# Patient Record
Sex: FEMALE | URBAN - METROPOLITAN AREA
[De-identification: names, ages, dates, MRNs, and addresses within clinical notes are randomized per-mention and may not be internally consistent; named-entity substitution may affect disease eponyms.]

---

## 2018-07-06 ENCOUNTER — IMPORTED ENCOUNTER (OUTPATIENT)
Dept: URBAN - METROPOLITAN AREA CLINIC 9 | Facility: CLINIC | Age: 33
End: 2018-07-06

## 2019-07-19 ENCOUNTER — IMPORTED ENCOUNTER (OUTPATIENT)
Dept: URBAN - METROPOLITAN AREA CLINIC 9 | Facility: CLINIC | Age: 34
End: 2019-07-19

## 2019-07-19 PROBLEM — H04.123: Noted: 2019-07-19

## 2021-10-16 ASSESSMENT — VISUAL ACUITY
OS_CC: 20/20 SN
OS_CC: 20/20 SN
OS_SC: 20/40 -2 SN
OD_SC: 20/20 - SN
OD_CC: 20/20 SN
OD_CC: 20/20 SN

## 2021-10-16 ASSESSMENT — TONOMETRY
OS_IOP_MMHG: 15
OD_IOP_MMHG: 14
OD_IOP_MMHG: 17
OS_IOP_MMHG: 14

## 2022-07-06 RX ORDER — METHYLPREDNISOLONE 4 MG/1
TABLET ORAL
COMMUNITY
Start: 2021-08-02

## 2022-07-06 RX ORDER — SPIRONOLACTONE 25 MG/1
TABLET ORAL
COMMUNITY
End: 2022-08-22 | Stop reason: SDUPTHER

## 2022-07-06 RX ORDER — MELOXICAM 15 MG/1
TABLET ORAL
COMMUNITY

## 2022-08-22 PROBLEM — M75.51 BURSITIS OF SHOULDER, RIGHT: Status: ACTIVE | Noted: 2022-08-22

## 2022-08-22 PROBLEM — G56.30 RADIAL NEUROPATHY: Status: ACTIVE | Noted: 2022-08-22

## 2022-08-22 PROBLEM — M19.041 ARTHRITIS OF FINGER OF RIGHT HAND: Status: ACTIVE | Noted: 2022-08-22

## 2023-05-01 ENCOUNTER — NEW PATIENT (OUTPATIENT)
Dept: URBAN - METROPOLITAN AREA CLINIC 9 | Facility: CLINIC | Age: 38
End: 2023-05-01

## 2023-05-01 DIAGNOSIS — H04.123: ICD-10-CM

## 2023-05-01 PROCEDURE — 92015 DETERMINE REFRACTIVE STATE: CPT

## 2023-05-01 PROCEDURE — 92004 COMPRE OPH EXAM NEW PT 1/>: CPT

## 2023-05-01 ASSESSMENT — TONOMETRY
OS_IOP_MMHG: 16
OD_IOP_MMHG: 17

## 2023-05-01 ASSESSMENT — VISUAL ACUITY
OD_SC: 20/30-1
OS_SC: 20/50-2

## 2024-12-02 ENCOUNTER — APPOINTMENT (RX ONLY)
Dept: URBAN - METROPOLITAN AREA CLINIC 20 | Facility: CLINIC | Age: 39
Setting detail: DERMATOLOGY
End: 2024-12-02

## 2024-12-02 DIAGNOSIS — Z41.9 ENCOUNTER FOR PROCEDURE FOR PURPOSES OTHER THAN REMEDYING HEALTH STATE, UNSPECIFIED: ICD-10-CM

## 2024-12-02 PROCEDURE — ? COSMETIC CONSULTATION: BOTOX

## 2024-12-02 PROCEDURE — ? INVENTORY

## 2024-12-02 PROCEDURE — ? COSMETIC CONSULTATION: COOLSCULPTING

## 2024-12-02 PROCEDURE — ? ADDITIONAL NOTES

## 2024-12-02 ASSESSMENT — LOCATION SIMPLE DESCRIPTION DERM
LOCATION SIMPLE: ABDOMEN
LOCATION SIMPLE: RIGHT FOREHEAD
LOCATION SIMPLE: GLABELLA
LOCATION SIMPLE: RIGHT TEMPLE
LOCATION SIMPLE: LEFT FOREHEAD
LOCATION SIMPLE: LEFT TEMPLE

## 2024-12-02 ASSESSMENT — LOCATION ZONE DERM
LOCATION ZONE: TRUNK
LOCATION ZONE: FACE

## 2024-12-02 ASSESSMENT — LOCATION DETAILED DESCRIPTION DERM
LOCATION DETAILED: LEFT FOREHEAD
LOCATION DETAILED: PERIUMBILICAL SKIN
LOCATION DETAILED: GLABELLA
LOCATION DETAILED: RIGHT INFERIOR FOREHEAD
LOCATION DETAILED: LEFT MEDIAL FOREHEAD
LOCATION DETAILED: LEFT INFERIOR TEMPLE
LOCATION DETAILED: RIGHT INFERIOR TEMPLE

## 2024-12-02 NOTE — PROCEDURE: ADDITIONAL NOTES
Detail Level: Simple
Render Risk Assessment In Note?: no
Additional Notes: 3 cycles of Core to lower abdomen.  Reassess at follow up for possible second treatment.

## 2025-02-05 ENCOUNTER — APPOINTMENT (OUTPATIENT)
Dept: URBAN - METROPOLITAN AREA CLINIC 20 | Facility: CLINIC | Age: 40
Setting detail: DERMATOLOGY
End: 2025-02-05

## 2025-02-05 DIAGNOSIS — Z41.9 ENCOUNTER FOR PROCEDURE FOR PURPOSES OTHER THAN REMEDYING HEALTH STATE, UNSPECIFIED: ICD-10-CM

## 2025-02-05 PROCEDURE — ? COOLSCULPTING

## 2025-02-05 PROCEDURE — ? INVENTORY

## 2025-02-05 PROCEDURE — ? ADDITIONAL NOTES

## 2025-02-05 PROCEDURE — ? PHOTO-DOCUMENTATION

## 2025-02-05 ASSESSMENT — LOCATION SIMPLE DESCRIPTION DERM: LOCATION SIMPLE: ABDOMEN

## 2025-02-05 ASSESSMENT — LOCATION ZONE DERM: LOCATION ZONE: TRUNK

## 2025-02-05 ASSESSMENT — LOCATION DETAILED DESCRIPTION DERM: LOCATION DETAILED: PERIUMBILICAL SKIN

## 2025-02-05 NOTE — PROCEDURE: COOLSCULPTING
Number Of Cycles: 1
Patient Weight (Optional): 135
Time (Minutes - Will Only Render If Nonzero): 0
Suction Settings: The suction settings were per protocol.
Location 2: lower abdomen (left)
Applicator Size: standard applicator
Detail Level: Zone
Treatment Administered By (Optional): Kassidy Ryan
Time (Minutes - Will Only Render If Nonzero): 35
Location 1: lower abdomen
Applicator Size: CoolCurve applicator
Device: Coolsculpting
Post Treatment: After treatment, the suction was turned off, and the applicator was removed from the skin. A 2 minute massage was performed.
Location 3: lower abdomen (right)
Applicator Size: CoolCore applicator
Consent: Written consent obtained, risks reviewed including, but not limited to, blistering from suction, darker or lighter pigmentary change, bruising, and/or need for multiple treatments.
Applicator Size: CoolAdvantage Core
Intro: Prior to treatment, the area was cleaned with prep pad and clearly marked with marking pen. The gel sheet was then applied uniformly. The applicator was applied to the skin with good contact and suction.

## 2025-05-05 ENCOUNTER — APPOINTMENT (OUTPATIENT)
Dept: URBAN - METROPOLITAN AREA CLINIC 20 | Facility: CLINIC | Age: 40
Setting detail: DERMATOLOGY
End: 2025-05-05

## 2025-05-05 DIAGNOSIS — Z41.9 ENCOUNTER FOR PROCEDURE FOR PURPOSES OTHER THAN REMEDYING HEALTH STATE, UNSPECIFIED: ICD-10-CM

## 2025-05-05 PROCEDURE — ? COSMETIC CONSULTATION: POTENZA RF MICRONEEDLING

## 2025-05-05 PROCEDURE — ? COSMETIC FOLLOW-UP

## 2025-05-05 PROCEDURE — ? INVENTORY

## 2025-05-05 PROCEDURE — ? PHOTO-DOCUMENTATION

## 2025-05-05 NOTE — PROCEDURE: COSMETIC FOLLOW-UP
Detail Level: Zone
Global Improvement: Marked
Patient Satisfaction: Very pleased
Side Effects Or Complications: None
Treatment (Optional): CoolSculpting
Comments (Free Text): Patient presents with reduction to superficial fat to treated area.  132 pounds at visit.  Patient overall satisfied with results, but, would like to discuss wrinkled skin around umbilicus and if there are treatments to smooth skin.

## 2025-07-18 ENCOUNTER — COMPREHENSIVE EXAM (OUTPATIENT)
Age: 40
End: 2025-07-18

## 2025-07-18 DIAGNOSIS — H04.123: ICD-10-CM

## 2025-07-18 DIAGNOSIS — H52.13: ICD-10-CM

## 2025-07-18 PROCEDURE — 92014 COMPRE OPH EXAM EST PT 1/>: CPT

## 2025-07-18 PROCEDURE — 92015 DETERMINE REFRACTIVE STATE: CPT
